# Patient Record
Sex: FEMALE | Race: WHITE | NOT HISPANIC OR LATINO | Employment: UNEMPLOYED | URBAN - METROPOLITAN AREA
[De-identification: names, ages, dates, MRNs, and addresses within clinical notes are randomized per-mention and may not be internally consistent; named-entity substitution may affect disease eponyms.]

---

## 2021-12-01 ENCOUNTER — OFFICE VISIT (OUTPATIENT)
Dept: FAMILY MEDICINE | Facility: CLINIC | Age: 21
End: 2021-12-01
Payer: MEDICAID

## 2021-12-01 VITALS
OXYGEN SATURATION: 97 % | RESPIRATION RATE: 14 BRPM | WEIGHT: 269 LBS | HEART RATE: 101 BPM | HEIGHT: 66 IN | BODY MASS INDEX: 43.23 KG/M2 | DIASTOLIC BLOOD PRESSURE: 88 MMHG | SYSTOLIC BLOOD PRESSURE: 124 MMHG

## 2021-12-01 DIAGNOSIS — F32.A ANXIETY AND DEPRESSION: ICD-10-CM

## 2021-12-01 DIAGNOSIS — Z13.220 ENCOUNTER FOR LIPID SCREENING FOR CARDIOVASCULAR DISEASE: ICD-10-CM

## 2021-12-01 DIAGNOSIS — Z13.21 SCREENING FOR ENDOCRINE, NUTRITIONAL, METABOLIC AND IMMUNITY DISORDER: ICD-10-CM

## 2021-12-01 DIAGNOSIS — Z13.6 ENCOUNTER FOR LIPID SCREENING FOR CARDIOVASCULAR DISEASE: ICD-10-CM

## 2021-12-01 DIAGNOSIS — Z76.89 ENCOUNTER TO ESTABLISH CARE: Primary | ICD-10-CM

## 2021-12-01 DIAGNOSIS — Z13.0 SCREENING FOR ENDOCRINE, NUTRITIONAL, METABOLIC AND IMMUNITY DISORDER: ICD-10-CM

## 2021-12-01 DIAGNOSIS — Z13.228 SCREENING FOR ENDOCRINE, NUTRITIONAL, METABOLIC AND IMMUNITY DISORDER: ICD-10-CM

## 2021-12-01 DIAGNOSIS — Z11.59 ENCOUNTER FOR SCREENING FOR OTHER VIRAL DISEASES: ICD-10-CM

## 2021-12-01 DIAGNOSIS — F41.9 ANXIETY AND DEPRESSION: ICD-10-CM

## 2021-12-01 DIAGNOSIS — Z00.00 ROUTINE ADULT HEALTH MAINTENANCE: ICD-10-CM

## 2021-12-01 DIAGNOSIS — Z13.29 SCREENING FOR ENDOCRINE, NUTRITIONAL, METABOLIC AND IMMUNITY DISORDER: ICD-10-CM

## 2021-12-01 PROCEDURE — 99204 OFFICE O/P NEW MOD 45 MIN: CPT | Mod: S$GLB,,, | Performed by: OBSTETRICS & GYNECOLOGY

## 2021-12-01 PROCEDURE — 99204 PR OFFICE/OUTPT VISIT, NEW, LEVL IV, 45-59 MIN: ICD-10-PCS | Mod: S$GLB,,, | Performed by: OBSTETRICS & GYNECOLOGY

## 2021-12-01 RX ORDER — CITALOPRAM 10 MG/1
10 TABLET ORAL DAILY
Qty: 30 TABLET | Refills: 2 | Status: SHIPPED | OUTPATIENT
Start: 2021-12-01

## 2021-12-01 RX ORDER — SERTRALINE HYDROCHLORIDE 100 MG/1
100 TABLET, FILM COATED ORAL DAILY
COMMUNITY
End: 2021-12-01

## 2021-12-01 RX ORDER — BUSPIRONE HYDROCHLORIDE 10 MG/1
10 TABLET ORAL 3 TIMES DAILY
Qty: 90 TABLET | Refills: 2 | Status: SHIPPED | OUTPATIENT
Start: 2021-12-01

## 2021-12-01 RX ORDER — BUSPIRONE HYDROCHLORIDE 10 MG/1
10 TABLET ORAL 3 TIMES DAILY
COMMUNITY
End: 2021-12-01 | Stop reason: SDUPTHER

## 2022-02-18 ENCOUNTER — TELEPHONE (OUTPATIENT)
Dept: FAMILY MEDICINE | Facility: CLINIC | Age: 22
End: 2022-02-18
Payer: MEDICAID

## 2022-02-18 NOTE — TELEPHONE ENCOUNTER
----- Message from Rae Gardiner sent at 2/18/2022  1:19 PM CST -----  Please call pt @ [phone] regarding another lab order, pt misplace the first order, pt states she can stop by and  today.

## 2022-02-23 ENCOUNTER — TELEPHONE (OUTPATIENT)
Dept: FAMILY MEDICINE | Facility: CLINIC | Age: 22
End: 2022-02-23
Payer: MEDICAID

## 2022-02-23 NOTE — TELEPHONE ENCOUNTER
Informed patient that we are getting her labs faxed over and that we will call her with the results. Patient verbalized understanding.

## 2022-02-23 NOTE — TELEPHONE ENCOUNTER
----- Message from Maylin Spring sent at 2/23/2022 10:00 AM CST -----  Regarding: Results  Contact: patient  Type:  Test Results    Who Called: Maya  Name of Test (Lab/Mammo/Etc):  Lab  Date of Test: 02/21/2022  Ordering Provider: Vijaya Alcantara   Where the test was performed: [facility]   Would the patient rather a call back or a response via [facility]?  Call Back  Best Call Back Number:  [phone] (home)     Additional Information:  Please call before 1:00 pm      [facility] [place]

## 2022-02-24 ENCOUNTER — TELEPHONE (OUTPATIENT)
Dept: FAMILY MEDICINE | Facility: CLINIC | Age: 22
End: 2022-02-24
Payer: MEDICAID

## 2022-02-24 NOTE — TELEPHONE ENCOUNTER
Informed patient that labs are within normal limits at this time. Patient verbalized understanding.

## 2022-02-24 NOTE — TELEPHONE ENCOUNTER
----- Message from Vijaya Alcantara NP sent at 2/24/2022  8:54 AM CST -----  Please inform pt all labs are within acceptable range at this time.

## 2022-08-26 ENCOUNTER — TELEPHONE (OUTPATIENT)
Dept: FAMILY MEDICINE | Facility: CLINIC | Age: 22
End: 2022-08-26
Payer: MEDICAID

## 2022-08-26 NOTE — TELEPHONE ENCOUNTER
Informed patient that we do not have any SDA at 3:30 on a Friday. Patient verbalized understanding.
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
## 2022-09-06 ENCOUNTER — TELEPHONE (OUTPATIENT)
Dept: FAMILY MEDICINE | Facility: CLINIC | Age: 22
End: 2022-09-06
Payer: MEDICAID
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
## 2022-09-06 NOTE — TELEPHONE ENCOUNTER
Informed patient that she has an appointment on Sept. 9th at 10Am. Patient verbalized understanding.

## 2022-10-26 ENCOUNTER — TELEPHONE (OUTPATIENT)
Dept: FAMILY MEDICINE | Facility: CLINIC | Age: 22
End: 2022-10-26
Payer: MEDICAID

## 2022-10-26 NOTE — TELEPHONE ENCOUNTER
Informed patient that she will need an appointment to discuss her concerns. LVM for patient to call back

## 2022-10-26 NOTE — TELEPHONE ENCOUNTER
----- Message from Vijaya Alcantara NP sent at 10/26/2022 10:20 AM CDT -----  Contact: pt  Needs a visit   ----- Message -----  From: Judit Webber MA  Sent: 10/26/2022  10:12 AM CDT  To: Vijaya Alcantara NP    Patient would like to have labs done.   ----- Message -----  From: Ana Rosa Quintanilla  Sent: 10/26/2022   9:16 AM CDT  To: Quinton JAMES Staff      Who Called:jia   Who Left Message for Patient:  Does the patient know what this is regarding?:pt has been having dizzy spells and fatigue wants to get tested for iron and blood work   Would the patient rather a call back or a response via [facility]?   Best Call Back Number:.[phone]    Additional Information:

## 2022-10-26 NOTE — TELEPHONE ENCOUNTER
----- Message from Anne Marie Brooks sent at 10/26/2022 10:49 AM CDT -----  Contact: self  Type:  Patient Returning Call    Who Called: Maya Alcantara   Who Left Message for Patient: Judit  Does the patient know what this is regarding?: Scheduling an appointment for dizziness, blurry vision, fatigue over the last few weeks  Would the patient rather a call back or a response via [facility]? Call back   Best Call Back Number: [phone]   Additional Information: n/a